# Patient Record
(demographics unavailable — no encounter records)

---

## 2025-02-03 NOTE — HISTORY OF PRESENT ILLNESS
[de-identified] : 02/03/2025:  SINDY ELY is a 54 year y/o F here for evaluation of their rt knee. pt states the pain started 5 weeks ago. pt complains of locking, tenderness on the outside of the knee. most pain when going up and down stairs. was giving naproxen at urgent care. she notes this is the third time it has happened within a year span. no injury. pain is localized     Bactrim  hypertension , graves' disease.

## 2025-02-03 NOTE — ASSESSMENT
[FreeTextEntry1] : 54 year F with right knee OA with lateral compartment wear - physical therapy and (MDP) was prescribed

## 2025-02-03 NOTE — IMAGING
[de-identified] : Constitutional: well developed and well nourished, able to communicate Cardiovascular: Peripheral vascular exam is grossly normal Neurologic: Alert and oriented, no acute distress. Skin: normal skin with no ulcers, rashes, or lesions Pulmonary: No respiratory distress, breathing comfortably on room air Lymphatics: No obvious lymphadenopathy or lymphedema in areas examined  RIGHT KNEE EXAM Alignment: Neutral Effusion: None Atrophy: None                                                  Stable to Varus/valgus stress Posterior Drawer Test: negative Anterior Drawer Test: Negative Knee Extension/Flexion: 0 / 120  Medial/lateral compartments Medial joint line: No Tenderness Lateral joint line: POS Tenderness Radha test: negative  Patellofemoral joint Medial patellar facet: no tenderness Patellar grind: Negative  Tendons: Pes Anserine: No tenderness Gerdys Tubercle/ IT Band: No tenderness Quadriceps Tendon: No Tenderness patellar tendon: no Tenderness Tibial tubercle: not tenderness Calf: no Tenderness  Neurovascular exam Muscle strength: 5/5 Sensation to light touch: intact Distal pulses: 2+  [id number] Xrays of the Right Knee were taken demonstrating severe PF OA and lateral compartment OA